# Patient Record
Sex: MALE | Race: ASIAN | NOT HISPANIC OR LATINO
[De-identification: names, ages, dates, MRNs, and addresses within clinical notes are randomized per-mention and may not be internally consistent; named-entity substitution may affect disease eponyms.]

---

## 2017-01-04 PROBLEM — Z00.00 ENCOUNTER FOR PREVENTIVE HEALTH EXAMINATION: Noted: 2017-01-04

## 2017-01-13 ENCOUNTER — APPOINTMENT (OUTPATIENT)
Dept: RHEUMATOLOGY | Facility: CLINIC | Age: 67
End: 2017-01-13

## 2017-05-10 ENCOUNTER — APPOINTMENT (OUTPATIENT)
Dept: OPHTHALMOLOGY | Facility: CLINIC | Age: 67
End: 2017-05-10

## 2017-05-10 PROBLEM — Z00.00 ENCOUNTER FOR PREVENTIVE HEALTH EXAMINATION: Noted: 2017-05-10

## 2019-01-25 ENCOUNTER — EMERGENCY (EMERGENCY)
Facility: HOSPITAL | Age: 69
LOS: 1 days | Discharge: HOME | End: 2019-01-25
Attending: EMERGENCY MEDICINE | Admitting: EMERGENCY MEDICINE
Payer: COMMERCIAL

## 2019-01-25 VITALS
TEMPERATURE: 98 F | OXYGEN SATURATION: 97 % | DIASTOLIC BLOOD PRESSURE: 88 MMHG | RESPIRATION RATE: 18 BRPM | SYSTOLIC BLOOD PRESSURE: 153 MMHG | HEART RATE: 98 BPM

## 2019-01-25 VITALS
DIASTOLIC BLOOD PRESSURE: 84 MMHG | RESPIRATION RATE: 16 BRPM | SYSTOLIC BLOOD PRESSURE: 137 MMHG | OXYGEN SATURATION: 98 % | TEMPERATURE: 98 F | HEART RATE: 89 BPM

## 2019-01-25 VITALS
HEART RATE: 80 BPM | TEMPERATURE: 98 F | OXYGEN SATURATION: 99 % | SYSTOLIC BLOOD PRESSURE: 133 MMHG | DIASTOLIC BLOOD PRESSURE: 89 MMHG | RESPIRATION RATE: 18 BRPM

## 2019-01-25 PROCEDURE — 73564 X-RAY EXAM KNEE 4 OR MORE: CPT | Mod: 26,RT

## 2019-01-25 PROCEDURE — 73130 X-RAY EXAM OF HAND: CPT | Mod: 26,50

## 2019-01-25 PROCEDURE — 70450 CT HEAD/BRAIN W/O DYE: CPT | Mod: 26

## 2019-01-25 PROCEDURE — 73502 X-RAY EXAM HIP UNI 2-3 VIEWS: CPT | Mod: 26,RT

## 2019-01-25 PROCEDURE — 73562 X-RAY EXAM OF KNEE 3: CPT | Mod: 26,RT

## 2019-01-25 PROCEDURE — 99283 EMERGENCY DEPT VISIT LOW MDM: CPT

## 2019-01-25 PROCEDURE — 99284 EMERGENCY DEPT VISIT MOD MDM: CPT | Mod: 25

## 2019-01-25 RX ORDER — ACETAMINOPHEN 500 MG
650 TABLET ORAL ONCE
Qty: 0 | Refills: 0 | Status: COMPLETED | OUTPATIENT
Start: 2019-01-25 | End: 2019-01-25

## 2019-01-25 RX ORDER — TETANUS TOXOID, REDUCED DIPHTHERIA TOXOID AND ACELLULAR PERTUSSIS VACCINE, ADSORBED 5; 2.5; 8; 8; 2.5 [IU]/.5ML; [IU]/.5ML; UG/.5ML; UG/.5ML; UG/.5ML
0.5 SUSPENSION INTRAMUSCULAR ONCE
Qty: 0 | Refills: 0 | Status: COMPLETED | OUTPATIENT
Start: 2019-01-25 | End: 2019-01-25

## 2019-01-25 RX ADMIN — Medication 650 MILLIGRAM(S): at 18:22

## 2019-01-25 RX ADMIN — TETANUS TOXOID, REDUCED DIPHTHERIA TOXOID AND ACELLULAR PERTUSSIS VACCINE, ADSORBED 0.5 MILLILITER(S): 5; 2.5; 8; 8; 2.5 SUSPENSION INTRAMUSCULAR at 08:42

## 2019-01-25 RX ADMIN — Medication 650 MILLIGRAM(S): at 08:42

## 2019-01-25 NOTE — ED PROVIDER NOTE - PROGRESS NOTE DETAILS
MD CHO:  Pt states he now feels discomfort in right hip.  Pelvis stable, full rom right hip, mild ttp lat aspect, will add xr right hip and pelvis.

## 2019-01-25 NOTE — ED ADULT TRIAGE NOTE - CHIEF COMPLAINT QUOTE
Pt states he was seen here earlier today for a lac bleed above the right eye. After being discharged home, pt c/o bleeding from his laceration. Denies taking blood thinners. Bleeding controlled at this time.

## 2019-01-25 NOTE — ED PROVIDER NOTE - MUSCULOSKELETAL MINIMAL EXAM
mild ttp b/l palms, right palm with small abrasion, right knee with superficial abrasion, mild ttp over lat aspect, full rom and wt bearing

## 2019-01-25 NOTE — ED PROVIDER NOTE - OBJECTIVE STATEMENT
69 YO M presents to ED after walking his dog this morning 7:30AM, didn't notice ice on sidewalk and slipped. Pt's head hit the ground. Pt went home and saw blood on face; cleaned face with antiseptic. Pt was then arrived to [facility] ED in the morning and examined by Dr Graham, exam and tests were performed and DC home. Pt maintained laceration to above rt eyelid which was glued with Dermabond. Pt went home and washed face, which re-opened wound. Tylenol last taken this morning. PMH hyper-triglyceride. Pt states he does not take anticoagulants. No pain noted anywhere. No further complaints.

## 2019-01-25 NOTE — ED PROVIDER NOTE - PHYSICAL EXAMINATION
in NAD, PHYSICAL EXAM:  Vital signs reviewed.  GENERAL: Patient is awake and alert and in no acute distress.  Non-toxic appearing.  A+Ox4  HEAD:  Airway patent.  No oropharyngeal edema.  No stridor.  Auricles are normal.   +scabbed hematoma over the RIGHT eye.  No Koehler's sign, no Raccoon eyes.  No mastoid tenderness to palpation.  No facial tenderness to palpation.   EYES: EOM grossly intact, conjunctiva non-injected and sclera clear  NECK: Supple, No vertebral point tenderness to palpation.  CHEST/LUNG: Lungs clear to auscultation bilaterally; no wheeze, no rhonchi,  no rales.    HEART: Regular rate and rhythm;   ABDOMEN: Soft, non-tender to palpation.  No rebound/no guarding.  Bowel sounds present x 4.   MSK/EXTREMITIES: No clubbing or cyanosis. Back is nontender, with no vertebral point tenderness to palpation, no CVAT.  Moving all 4 extremities.     NEURO: Neurologically grossly intact.   No obvious deficits.   PSYCH: Psychiatrically normal mood and affect.  No apparent risk to self or others.

## 2019-01-25 NOTE — ED PROVIDER NOTE - RIGHT FACE
abrasion/laceration at lat aspect of right eye with surrounding ecchymoses and swelling, no palp underlying bony abn, non ttp

## 2019-01-25 NOTE — ED PROVIDER NOTE - NSFOLLOWUPINSTRUCTIONS_ED_ALL_ED_FT
Instructions for follow-up, activity and diet:  Take Tylenol (Acetaminophen) 625mg (two 325 mg over the counter pills) every 4 hours or 975mg *three 325mg pills over the counter every 6 hours) as needed for  pain.  Follow up with doctors as recommended.  Return to the ER for worsening or persistent symptoms, including but not limited to fevers, headaches, numbness, weakness, vision changes, double vision, worsening pain or inability to tolerate oral medications and/or ANY NEW OR CONCERNING SYMPTOMS. If you have issues obtaining follow up, please call: [phone] to obtain a doctor or specialist who takes your insurance in your area. Instructions for follow-up, activity and diet:  Take Tylenol (Acetaminophen) 625mg (two 325 mg over the counter pills) every 4 hours or 975mg *three 325mg pills over the counter every 6 hours) as needed for  pain.  Follow up with doctors as recommended.  Return to the ER for worsening or persistent symptoms, including but not limited to fevers, headaches, numbness, weakness, vision changes, double vision, worsening pain or inability to tolerate oral medications and/or ANY NEW OR CONCERNING SYMPTOMS. If you have issues obtaining follow up, please call: [phone] to obtain a doctor or specialist who takes your insurance in your area.    Skin Tear Care  A skin tear is a wound in which the top layers of skin have peeled off the deeper skin or tissues underneath them. This is a common problem as people get older because the skin becomes thinner and more fragile. In addition, some medicines, such as oral corticosteroids, can lead to skin thinning if they are taken for long periods of time.    A skin tear is often repaired with tape or skin adhesive strips. Depending on the location of the wound, a bandage (dressing) may be applied over the tape or skin adhesive strips.    Follow these instructions at home:  Wound care     Clean the wound as told by your health care provider. You may be instructed to keep the wound dry for the first few days. If you are told to clean the wound:    Wash the wound with mild soap and water or a salt–water (saline) solution.  Rinse the wound with water to remove all soap.  Do not rub the wound dry. Let the wound air dry.    Change any dressings as told by your health care provider. This includes changing the dressing if it gets wet, gets dirty, or starts to smell bad.  Do not scratch or pick at the wound.  Protect the injured area until it has healed.  Check your wound every day for signs of infection. Check for:    More redness, swelling, or pain.  More fluid or blood.  Warmth.  Pus or a bad smell.    Medicines     Image   Take over-the-counter and prescription medicines only as told by your health care provider.  If you were prescribed an antibiotic medicine, take or apply it as told by your health care provider. Do not stop using the antibiotic even if your condition improves.  General instructions     Keep the dressing dry as told by your health care provider.  Do not take baths, swim, or do anything that puts your wound underwater until your health care provider approves.  Keep all follow-up visits as told by your health care provider. This is important.  Contact a health care provider if:  You have more redness, swelling, or pain around your wound.  You have more fluid or blood coming from your wound.  Your wound feels warm to the touch.  You have pus or a bad smell coming from your wound.  Get help right away if:  You have a red streak that goes away from the skin tear.  You have a fever and chills and your symptoms suddenly get worse.  This information is not intended to replace advice given to you by your health care provider. Make sure you discuss any questions you have with your health care provider.

## 2019-01-25 NOTE — ED PROVIDER NOTE - OBJECTIVE STATEMENT
69 y/o male s/p [facility] fall.  Pt states he was walking dog, slipped on ice, struck right side of face, both hands, right knee on concrete.  Had brief loc, witnessed by bystander in street.  States he walked home and cleaned himself off.  Noted bleeding from right side of face near eye, right palm, blood inside his mouth.  Cleaned off wounds and came to ED for eval.  Not on ac's.  Unsure of last tetanus.  Denies neck pain, numbness/tingling/weakness to arms/legs, balance issues.

## 2019-01-25 NOTE — ED ADULT TRIAGE NOTE - CHIEF COMPLAINT QUOTE
pt arrives s/p fall. pt states was walking and missed his step hitting his head on concrete. pt with abrasion to right side of eye and right hand. pt denies LOC, nausea, vomiting. pt ambulatory to triage.

## 2019-01-25 NOTE — ED PROVIDER NOTE - MEDICAL DECISION MAKING DETAILS
67 y/o male s/p [facility] fall with brief loc, no neuro deficits at present, gcs 15, mult scattered abrasions, no active blds.  Given age, r/o ich.  Obtain ct head, xr hands, xr right knee, update tetanus, acetaminophen for pain, reassess.

## 2019-01-25 NOTE — ED PROVIDER NOTE - NS_ ATTENDINGSCRIBEDETAILS _ED_A_ED_FT
The scribe's documentation has been prepared under my direction and personally reviewed by me, Wilma Del Castillo MD, in its entirety. I confirm that the note above accurately reflects all work, treatment, procedures, and medical decision making performed by me.

## 2019-02-01 NOTE — ED PROVIDER NOTE - CROS ED SKIN ALL NEG
Phone call states-- [facility] is requiring prior auth. I will send humira to [facility] for auth. Please inform pt. I can offer her low dose prednisone for her to take by mouth if she can tolerate this. Let me know.   - - -

## 2019-02-08 ENCOUNTER — TRANSCRIPTION ENCOUNTER (OUTPATIENT)
Age: 69
End: 2019-02-08

## 2021-05-03 PROBLEM — E78.5 HYPERLIPIDEMIA, UNSPECIFIED: Chronic | Status: ACTIVE | Noted: 2019-01-25

## 2021-05-10 ENCOUNTER — APPOINTMENT (OUTPATIENT)
Dept: OTOLARYNGOLOGY | Facility: CLINIC | Age: 71
End: 2021-05-10
Payer: MEDICARE

## 2021-05-10 ENCOUNTER — NON-APPOINTMENT (OUTPATIENT)
Age: 71
End: 2021-05-10

## 2021-05-10 VITALS
WEIGHT: 185 LBS | TEMPERATURE: 98 F | SYSTOLIC BLOOD PRESSURE: 143 MMHG | HEART RATE: 116 BPM | BODY MASS INDEX: 27.4 KG/M2 | DIASTOLIC BLOOD PRESSURE: 92 MMHG | HEIGHT: 69 IN

## 2021-05-10 DIAGNOSIS — R42 DIZZINESS AND GIDDINESS: ICD-10-CM

## 2021-05-10 DIAGNOSIS — R09.81 NASAL CONGESTION: ICD-10-CM

## 2021-05-10 DIAGNOSIS — J31.0 CHRONIC RHINITIS: ICD-10-CM

## 2021-05-10 DIAGNOSIS — H90.3 SENSORINEURAL HEARING LOSS, BILATERAL: ICD-10-CM

## 2021-05-10 DIAGNOSIS — J34.2 DEVIATED NASAL SEPTUM: ICD-10-CM

## 2021-05-10 PROCEDURE — 92567 TYMPANOMETRY: CPT

## 2021-05-10 PROCEDURE — 31231 NASAL ENDOSCOPY DX: CPT

## 2021-05-10 PROCEDURE — 99204 OFFICE O/P NEW MOD 45 MIN: CPT | Mod: 25

## 2021-05-10 PROCEDURE — 99072 ADDL SUPL MATRL&STAF TM PHE: CPT

## 2021-05-10 PROCEDURE — 92557 COMPREHENSIVE HEARING TEST: CPT

## 2021-05-10 NOTE — DATA REVIEWED
[de-identified] : Hearing Test performed to evaluate the extent of hearing loss and  to explain pt's symptoms\par today's hearing test was personally reviewed and revealed\par bilat SNHL

## 2021-05-10 NOTE — PROCEDURE
[FreeTextEntry6] : Anterior Rhinoscopy insufficient to account for symptoms.\par \par After informed verbal consent is obtained, the fiberoptic nasal endoscope #19 is passed via the right nasal cavity.\par The following anatomic sites were directly examined in a sequential fashion:\par The scope was introduced in both  nasal passages between the middle and inferior turbinates to exam the inferior portion of the middle meatus and the fontanelle, as well as the maxillary ostia.  Next, the scope was passed medially and posteriorly to the middle turbinates to examine the sphenoethmoid recess and the superior turbinate region.\par  \par \par   There is [ 0 ]% obstruction of the nasopharynx with adenoid tissue.\par \par A right deviated nasal septum was noted causing obstruction.\par The turbinates were congested-bilateral.\par \par Right Side:\par ·               Mucosa: wnl\par ·               Mucous: none\par ·               Polyp: none\par ·               Inferior Turbinate: sl congested\par ·               Middle Turbinate:sl congested\par ·               Superior Turbinate: wnl\par ·               Inferior Meatus:clear\par ·               Middle Meatus: clear\par ·               Super Meatus: clear\par ·               Sphenoethmoidal Recess: wnl\par \par \par \par Left Side:\par ·               Mucosa: wnl\par ·               Mucous:none\par ·               Polyp: none\par ·               Inferior Turbinate: sl congested\par ·               Middle Turbinate: sl congested\par ·               Superior Turbinate:wnl\par ·               Inferior Meatus: clear\par ·               Middle Meatus: clear\par ·               Super Meatus:clear\par ·               Sphenoethmoidal Recess: wnl\par \par

## 2021-05-10 NOTE — END OF VISIT
[FreeTextEntry3] : I personally saw and examined NABEEL MERINO in detail. I spoke to MARTHA Babcock regarding the assessment and plan of care. I reviewed the above assessment and plan of care, and agree. I have made changes in changes in the body of the note where appropriate.I personally reviewed the HPI, PMH, FH, SH, ROS and medications/allergies. I have spoken to MARTHA Babcock regarding the history and have personally determined the assessment and plan of care, and documented this myself. I was present and participated in all key portions of the encounter and all procedures noted above. I have made changes in the body of the note where appropriate.\par \par Attesting Faculty: See Attending Signature Below \par \par \par  [Time Spent: ___ minutes] : I have spent [unfilled] minutes of time on the encounter.

## 2021-05-10 NOTE — ASSESSMENT
[FreeTextEntry1] : Patient complains of vertigo since the COVID vaccine 4/23/2021, and nasal congesetion for many years\par \par Vertigo:\par -Hearing Test performed to evaluate the extent of hearing loss and to explain pt's symptoms\par bilateral sensorineural hearing loss-cleared for hearing aids\par -Vestibular rehab ordered poss vestib testing\par \par DNS and Rhinitis\par -Rx: Steam humidification and hypertonic saline nasal irrigations \par rx-medrol dospak\par \par \par f/u 10 days

## 2021-05-10 NOTE — HISTORY OF PRESENT ILLNESS
[No] : patient does not have a  history of radiation therapy [de-identified] : 69 yo male\par Patient complains of vertigo since April 24. States he got the COVID vaccine April 23, next day he got severe vertigo the room was spinning, worse when he turns to the right, can also happen when he turns to the left. Has difficulty getting up, laying down and bending forward without getting dizzy.  Also complains of nasal congestion, using Flonase with no relief.  Pt has no ear pain, ear drainage, hearing loss, tinnitus, nasal discharge, epistaxis, sinus infections, facial pain, facial pressure, throat pain, dysphagia or fevers\par   [Otorrhea] : no otorrhea [Vertigo] : vertigo [Eustachian Tube Dysfunction] : no eustachian tube dysfunction [Cholesteatoma] : no cholesteatoma [Early Onset Hearing Loss] : no early onset hearing loss [Stroke] : no stroke [Nasal Congestion] : nasal congestion [Allergic Rhinitis] : no allergic rhinitis [Adenoidectomy] : no adenoidectomy [Allergies] : no allergies [Asthma] : no asthma [Hyperthyroidism] : no hyperthyroidism [Sialadenitis] : no sialadenitis [Hodgkin Disease] : no hodgkin disease [Non-Hodgkin Lymphoma] : no non-hodgkin lymphoma [None] : No risk factors have been identified.

## 2021-05-10 NOTE — PHYSICAL EXAM
[Midline] : trachea located in midline position [Normal] : no rashes [Hearing Loss Right Only] : normal [Hearing Loss Left Only] : normal [Nystagmus] : ~T no ~M nystagmus was seen [Fukuda Step Test] : Fukuda Step Test was Positive [[place]] : [place]: Negative [de-identified] : abn

## 2021-08-12 ENCOUNTER — APPOINTMENT (OUTPATIENT)
Dept: NEUROLOGY | Facility: CLINIC | Age: 71
End: 2021-08-12
Payer: MEDICARE

## 2021-08-12 VITALS
WEIGHT: 185 LBS | SYSTOLIC BLOOD PRESSURE: 149 MMHG | HEART RATE: 81 BPM | BODY MASS INDEX: 27.4 KG/M2 | DIASTOLIC BLOOD PRESSURE: 91 MMHG | HEIGHT: 69 IN

## 2021-08-12 DIAGNOSIS — Z87.438 PERSONAL HISTORY OF OTHER DISEASES OF MALE GENITAL ORGANS: ICD-10-CM

## 2021-08-12 DIAGNOSIS — R51.9 HEADACHE, UNSPECIFIED: ICD-10-CM

## 2021-08-12 DIAGNOSIS — E78.5 HYPERLIPIDEMIA, UNSPECIFIED: ICD-10-CM

## 2021-08-12 DIAGNOSIS — R42 DIZZINESS AND GIDDINESS: ICD-10-CM

## 2021-08-12 DIAGNOSIS — F17.200 NICOTINE DEPENDENCE, UNSPECIFIED, UNCOMPLICATED: ICD-10-CM

## 2021-08-12 PROCEDURE — 99205 OFFICE O/P NEW HI 60 MIN: CPT

## 2021-08-12 RX ORDER — TAMSULOSIN HYDROCHLORIDE 0.4 MG/1
0.4 CAPSULE ORAL
Refills: 0 | Status: ACTIVE | COMMUNITY

## 2021-08-12 RX ORDER — METHYLPREDNISOLONE 4 MG/1
4 TABLET ORAL
Qty: 1 | Refills: 1 | Status: DISCONTINUED | COMMUNITY
Start: 2021-05-10 | End: 2021-08-12

## 2021-08-12 RX ORDER — ICOSAPENT ETHYL 500 MG/1
CAPSULE ORAL
Refills: 0 | Status: ACTIVE | COMMUNITY

## 2021-08-12 RX ORDER — FINASTERIDE 5 MG/1
5 TABLET, FILM COATED ORAL
Refills: 0 | Status: ACTIVE | COMMUNITY

## 2021-08-12 RX ORDER — ATORVASTATIN CALCIUM 10 MG/1
10 TABLET, FILM COATED ORAL
Refills: 0 | Status: ACTIVE | COMMUNITY

## 2021-08-12 NOTE — ASSESSMENT
[FreeTextEntry1] : Assessment/Plan:\par  70 year old male  with a hx of b/l SNHL, Chronic rhinitis, Deviated nasal septum, HLD, BPH  is referred for hx of vertigo, headaches and head numbness. He experienced new onset vertigo following his COVID vaccine in April 2021 consistent with acute vestibular dysfunction with positional vertigo which has significantly improved since completion of vestibular therapy. He now describes slight dizziness with frontal head pressure and subjective numbness behind his ears. Neurological exam normal. MRI brain normal, MRI cervical spine with multilevel degenerative disease with moderate canal stenosis and neural foraminal narrowing. Of note, he also has a hx of MARLENE and does not use Cpap. \par \par Episodic vertigo- likely 2/2 inflammatory response post COVID-19 vaccine. Now 80% better. \par Frontal headaches- tension type headaches, 2/2 sinus disease +/- untreated sleep apnea\par posterior scalp numbness- likely 2/2 to cervical spine disease\par \par \par Plan:-\par [] Continue vestibular exercises \par [] Management of the headaches symptomatically with OTC pain medications\par [] Recommended follow up with sleep specialist for MARLENE\par Discussed the importance of sleep apnea evaluation and treatment. If left untreated, obstructive sleep apnea can increase the risk of health problems, including: High blood pressure, Stroke, Heart failure, and heart attacks. Untreated sleep apnea can also exacerbate headaches.\par \par \par Headache education provided:\par [] Stay well hydrated\par [] Limit excessive caffeine and alcohol intake\par [] Maintain good sleep hygiene\par [] Try to avoid triggers; Lifestyle modifications\par [] Practice good eating habits\par [] Avoid excessive use of over the counter pain medications, as they can cause medication overuse headaches \par [] Keep a headache diary\par [] Relaxation techniques, biofeedback, massage therapy, acupunctures, and heating pads may be effective\par \par Return to clinic as needed\par \par Available imaging studies and/or blood work up were reviewed. A detailed chart review was completed prior to visit.\par \par The above plan was discussed with NABEEL MERINO in great detail.  NABEEL MERINO verbalized understanding and agrees with plan as detailed above. Patient was provided education and counselling on current diagnosis/symptoms, diagnostic work up, treatment options and potential side effects of any prescribed therapy/therapies. He was advised to call our clinic at [phone] for any new or worsening symptoms, or with any questions or concerns. In case of acute onset of neurological symptoms or worsening presentation, patient was advised to present to nearest emergency room for further evaluation. NABEEL MERINO expressed understanding and all his questions/concerns were addressed.\par \par Ros Osorio M.D\par

## 2021-08-12 NOTE — PHYSICAL EXAM
[FreeTextEntry1] : PHYSICAL EXAM\par Constitutional: Alert, no acute distress \par Neck: Full range of motion\par Psychiatric: appropriate affect and mood\par Pulmonary: No respiratory distress, stable on room air\par \par NEUROLOGICAL EXAM\par Mental status: The patient is alert, attentive, and intact conversational memory.\par Speech/language: No dysarthria\par Cranial nerves:\par CN II: Visual fields are full to confrontation. Pupil size equal and briskly reactive to light. \par CN III, IV, VI: EOMI, no nystagmus, no ptosis\par CN V: Facial sensation is intact to pinprick in all 3 divisions bilaterally.\par CN VII: Face is symmetric with normal eye closure and smile.\par CN VII: Hearing is normal to rubbing fingers\par CN IX, X: Palate elevates symmetrically. Phonation is normal.\par CN XI: Head turning and shoulder shrug are intact\par CN XII: Tongue is midline with normal movements and no atrophy.\par Motor: Strength is full bilaterally. 5/5 muscle power at bilat: Deltoid, Biceps, Triceps, Wrist ext, Finger abd, Hip flex, Hip ext, Knee flex, Knee ext, Ankle flex, Ankle ext\par Reflexes: Reflexes are 2+ and symmetric at the biceps and unable to illicit knee jerks. \par Sensory: Intact sensations to light touch in upper and lower extremities\par Coordination: Rapid alternating movements and fine finger movements are intact. There is no dysmetria on finger-to-nose. There are no abnormal or extraneous movements. \par Gait/Stance: Posture is normal. Gait is steady with normal steps, base, arm swing, and turning. \par \par \par \par

## 2021-12-18 NOTE — ED PROVIDER NOTE - DURATION
today Implemented All Universal Safety Interventions:  [place] to call system. Call bell, personal items and telephone within reach. Instruct patient to call for assistance. Room bathroom lighting operational. Non-slip footwear when patient is off stretcher. Physically safe environment: no spills, clutter or unnecessary equipment. Stretcher in lowest position, wheels locked, appropriate side rails in place.

## 2023-04-14 ENCOUNTER — APPOINTMENT (OUTPATIENT)
Dept: PULMONOLOGY | Facility: CLINIC | Age: 73
End: 2023-04-14
Payer: MEDICARE

## 2023-04-14 VITALS
SYSTOLIC BLOOD PRESSURE: 132 MMHG | OXYGEN SATURATION: 96 % | BODY MASS INDEX: 27.17 KG/M2 | WEIGHT: 184 LBS | DIASTOLIC BLOOD PRESSURE: 80 MMHG | HEART RATE: 81 BPM | TEMPERATURE: 98.6 F

## 2023-04-14 DIAGNOSIS — R53.83 OTHER FATIGUE: ICD-10-CM

## 2023-04-14 DIAGNOSIS — G47.33 OBSTRUCTIVE SLEEP APNEA (ADULT) (PEDIATRIC): ICD-10-CM

## 2023-04-14 PROCEDURE — 99203 OFFICE O/P NEW LOW 30 MIN: CPT

## 2023-04-19 PROBLEM — R53.83 FATIGUE: Status: ACTIVE | Noted: 2023-04-19

## 2023-04-19 PROBLEM — G47.33 OSA (OBSTRUCTIVE SLEEP APNEA): Status: ACTIVE | Noted: 2021-08-12

## 2023-04-19 NOTE — HISTORY OF PRESENT ILLNESS
[Current] : current [>= 20 pack years] : >= 20 pack years [TextBox_4] : 73 yo male with hx of MARLENE diagnosed nearly forty years ago, presents for evaluation. The patient last used CPAP ten years ago, with subsequent oral appliance use which he last used over two years ago.Last sleep study was three years ago. Presently he has sleep related complaints.He denies sedative/hypnotic med use or excessive alcohol intake. [Awakes Unrefreshed] : does not awaken unrefreshed [Awakes with Dry Mouth] : awakes with dry mouth [Daytime Somnolence] : daytime somnolence [Fatigue] : fatigue [Snoring] : snoring

## 2023-04-19 NOTE — DISCUSSION/SUMMARY
[FreeTextEntry1] : 73 yo male with hx of MARLENE with related symptoms and complaints. Need for treatment was stressed. A split sleep study will be performed. He is to follow up with his PMD as before

## 2025-03-17 NOTE — HISTORY OF PRESENT ILLNESS
[FreeTextEntry1] : HPI (initial visit Aug 12, 2021)- Julián is a 70 year old man with a hx of b/l SNHL, Chronic rhinitis, Deviated nasal septum, HLD, BPH  is referred for hx of vertigo. He began to experience vertigo after his COVID-19 vaccination 4/23/2021. He was seen by Dr. Jorge Headley (ENT) in May 2021, at which time vertigo was described as room spinning sensation, worse with turning head and bending down. He was prescribes a course of steroid s (medrol dose pack) and referred to vestibular therapy. He did not follow up. He reports after 7 weeks of the experience his vertigo has improved. Vertigo is 80% better. He describes now "light dizziness" occasionally. He also describes pressure over front of head x 1 month. The head pain is every 2 days. Mostly in afternoon. NO nasal congestion or runny nose. No nausea/vomiting. No photophobia. No blurring of vision. He just massages the head, does not take any medications, he rests. The headache is not debilitating. He also reports some numbness sensation behind his ears that travels from neck. He does describe neck pain. \par \par He has sleep apnea, but does not wear a mask, he cannot tolerate it. "They told me my sleep apnea is very bad". He wears dentures. \par \par MRI brain ordered by family doctor- competed 7/14/2021:- "mild chronic small vessel ischemic changes in the frontoparietal white matter. No acute infarct or hemorrhage".  ([facility] radiology)\par Xray cervical spine 7/20/2021:- "limited evaluation of C7. Moderate to severe degenerative disc disease". \par MRI cervical spine 7/23/2021:- ("neck pain")- moderate spinal canal stenosis at C5-C6 and C6-C7, mild at C3-C4 and C4-C5. Severe neural foraminal narrowing at right C5-C6 and b/l C6-C7. MOderate at C4-C5, left C5-C6, and b/l C7-T1. \par \par No hx of COVID-19 infection. Her/She